# Patient Record
Sex: FEMALE | Race: WHITE | NOT HISPANIC OR LATINO | Employment: FULL TIME | URBAN - NONMETROPOLITAN AREA
[De-identification: names, ages, dates, MRNs, and addresses within clinical notes are randomized per-mention and may not be internally consistent; named-entity substitution may affect disease eponyms.]

---

## 2023-08-01 PROBLEM — M81.0 OSTEOPOROSIS: Status: ACTIVE | Noted: 2023-08-01

## 2023-08-01 PROBLEM — Z78.0 POSTMENOPAUSAL: Status: ACTIVE | Noted: 2023-08-01

## 2023-08-01 PROBLEM — E78.00 HYPERCHOLESTEREMIA: Status: ACTIVE | Noted: 2023-08-01

## 2023-08-01 PROBLEM — R73.03 PREDIABETES: Status: ACTIVE | Noted: 2023-08-01

## 2023-08-01 PROBLEM — E55.9 VITAMIN D DEFICIENCY: Status: ACTIVE | Noted: 2023-08-01

## 2023-08-01 NOTE — PROGRESS NOTES
SUBJECTIVE:      Radha Castañeda. Is 68 y.o.. female. Here for follow up office visit-       Chief Complaint   Patient presents with    Follow-up     6 mo fuv    Hyperlipidemia    Osteoporosis    Prediabetes           Vitamin D Deficiency       HPI:      Follow up for vit d, chol, prediabetes , osteoporosis      Did pt have DXA testing done? No- ordered           Pt is doing well      Due for lab       [facility] Encounter on 02/06/2023   Component Date Value Ref Range Status    Vitamin D, 25-Hydroxy 02/06/2023 37  ng/mL Final    Comment: .  DEFICIENCY:         < 20   NG/ML  INSUFFICIENCY:      20-29  NG/ML  SUFFICIENCY:         NG/ML    THIS ASSAY ACCURATELY QUANTIFIES THE SUM OF  VITAMIN D3, 25-HYDROXY AND VIT D2,25-HYDROXY.      Albumin 02/06/2023 5.0  3.4 - 5.0 g/dL Final    Total Bilirubin 02/06/2023 0.9  0.0 - 1.2 mg/dL Final    Bilirubin, Direct 02/06/2023 0.2  0.0 - 0.3 mg/dL Final    Alkaline Phosphatase 02/06/2023 68  33 - 136 U/L Final    ALT (SGPT) 02/06/2023 20  7 - 45 U/L Final    Comment:  Patients treated with Sulfasalazine may generate    falsely decreased results for ALT.      AST 02/06/2023 20  9 - 39 U/L Final    Total Protein 02/06/2023 7.5  6.4 - 8.2 g/dL Final    Cholesterol 02/06/2023 209 (H)  0 - 199 mg/dL Final    Comment: .      AGE      DESIRABLE   BORDERLINE HIGH   HIGH     0-19 Y     0 - 169       170 - 199     >/= 200    20-24 Y     0 - 189       190 - 224     >/= 225         >24 Y     0 - 199       200 - 239     >/= 240   **All ranges are based on fasting samples. Specific   therapeutic targets will vary based on patient-specific   cardiac risk.  .   Pediatric guidelines reference:Pediatrics 2011, 128(S5).   Adult guidelines reference: NCEP ATPIII Guidelines,     ANTONETTE 2001, 258:2486-97  .   Venipuncture immediately after or during the    administration of Metamizole may lead to falsely   low results. Testing should be performed immediately   prior to Metamizole dosing.      HDL  02/06/2023 87.1  mg/dL Final    Comment: .      AGE      VERY LOW   LOW     NORMAL    HIGH       0-19 Y       < 35   < 40     40-45     ----    20-24 Y       ----   < 40       >45     ----      >24 Y       ----   < 40     40-60      >60  .      Cholesterol/HDL Ratio 02/06/2023 2.4   Final    Comment: REF VALUES  DESIRABLE  < 3.4  HIGH RISK  > 5.0      LDL 02/06/2023 99  0 - 99 mg/dL Final    Comment: .                           NEAR      BORD      AGE      DESIRABLE  OPTIMAL    HIGH     HIGH     VERY HIGH     0-19 Y     0 - 109     ---    110-129   >/= 130     ----    20-24 Y     0 - 119     ---    120-159   >/= 160     ----      >24 Y     0 -  99   100-129  130-159   160-189     >/=190  .      VLDL 02/06/2023 23  0 - 40 mg/dL Final    Triglycerides 02/06/2023 116  0 - 149 mg/dL Final    Comment: .      AGE      DESIRABLE   BORDERLINE HIGH   HIGH     VERY HIGH   0 D-90 D    19 - 174         ----         ----        ----  91 D- 9 Y     0 -  74        75 -  99     >/= 100      ----    10-19 Y     0 -  89        90 - 129     >/= 130      ----    20-24 Y     0 - 114       115 - 149     >/= 150      ----         >24 Y     0 - 149       150 - 199    200- 499    >/= 500  .   Venipuncture immediately after or during the    administration of Metamizole may lead to falsely   low results. Testing should be performed immediately   prior to Metamizole dosing.      Hemoglobin A1C 02/06/2023 5.4  % Final    Comment:      Diagnosis of Diabetes-Adults   Non-Diabetic: < or = 5.6%   Increased risk for developing diabetes: 5.7-6.4%   Diagnostic of diabetes: > or = 6.5%  .       Monitoring of Diabetes                Age (y)     Therapeutic Goal (%)   Adults:          >18           <7.0   Pediatrics:    13-18           <7.5                   7-12           <8.0                   0- 6            7.5-8.5   American Diabetes Association. Diabetes Care 33(S1), Jan 2010.      Estimated Average Glucose 02/06/2023 108  MG/DL Final  "        REVIEW OF SYSTEMS:        OBJECTIVE:    Vitals:    08/07/23 0908   BP: 128/73   BP Location: Left arm   Patient Position: Sitting   BP Cuff Size: Adult   Pulse: 88   Resp: 14   Temp: 37 °C (98.6 °F)   SpO2: 96%   Weight: 64.5 kg (142 lb 1.6 oz)   Height: 1.657 m (5' 5.25\")       PHYSICAL:      Patient is alert and oriented x 3 , NAD  HEAD- normocephalic and atraumatic   EYES-conjunctiva-normal, lids - normal  EARS/NOSE- normal external exam   NECK-supple,FROM  CV- RRR without murmur  PULM- CTA bilaterally, normal respiratory effort  RESPIRATORY EFFORT- normal , no retractions or nasal flaring   ABD- normoactive BS's   EXT- no edema,NT  SKIN- no abnormal skin lesions noted  NEURO- no focal deficits  PSYCH- pleasant, normal judgement and insight    Wt Readings from Last 3 Encounters:   08/07/23 64.5 kg (142 lb 1.6 oz)   02/06/23 63.2 kg (139 lb 4.8 oz)   08/05/22 62.3 kg (137 lb 4.8 oz)       The number and complexity of problems addressed is considered moderate.  The amount and/or complexity of data reviewed and analyzed is considered moderate. The risk of complications and/or morbidity/mortality of patient is considered moderate. Overall, this patient encounter is considered a moderate risk visit.          ASSESSMENT/PLAN:    Problem List Items Addressed This Visit          Active Problems    Hypercholesteremia - Primary    Relevant Medications    atorvastatin (Lipitor) 10 mg tablet    Other Relevant Orders    Follow Up In [facility] - PCP - Established    Osteoporosis    Postmenopausal    Relevant Orders    XR bone density w SPECT lumbar    Prediabetes    Relevant Orders    Hemoglobin A1C    Vitamin D deficiency    Relevant Orders    Vitamin D, Total     Other Visit Diagnoses       Hypercholesterolemia        Relevant Orders    Hepatic Function Panel    Lipid Panel            Orders Placed This Encounter   Procedures    XR bone density w SPECT lumbar    Hepatic Function Panel    Lipid Panel    " Vitamin D, Total    Hemoglobin A1C               Continue medication      Ordered lab      Follow up in 6 months

## 2023-08-03 RX ORDER — MULTIVITAMIN
1 TABLET ORAL DAILY
COMMUNITY

## 2023-08-03 RX ORDER — ACETAMINOPHEN 500 MG
50 TABLET ORAL
COMMUNITY

## 2023-08-03 RX ORDER — ATORVASTATIN CALCIUM 10 MG/1
1 TABLET, FILM COATED ORAL DAILY
COMMUNITY
Start: 2019-11-05 | End: 2023-08-07 | Stop reason: SDUPTHER

## 2023-08-07 ENCOUNTER — OFFICE VISIT (OUTPATIENT)
Dept: PRIMARY CARE | Facility: CLINIC | Age: 68
End: 2023-08-07
Payer: MEDICARE

## 2023-08-07 ENCOUNTER — LAB (OUTPATIENT)
Dept: LAB | Facility: LAB | Age: 68
End: 2023-08-07
Payer: MEDICARE

## 2023-08-07 VITALS
SYSTOLIC BLOOD PRESSURE: 128 MMHG | HEIGHT: 65 IN | DIASTOLIC BLOOD PRESSURE: 73 MMHG | HEART RATE: 88 BPM | WEIGHT: 142.1 LBS | BODY MASS INDEX: 23.68 KG/M2 | TEMPERATURE: 98.6 F | RESPIRATION RATE: 14 BRPM | OXYGEN SATURATION: 96 %

## 2023-08-07 DIAGNOSIS — E78.00 HYPERCHOLESTEREMIA: Primary | ICD-10-CM

## 2023-08-07 DIAGNOSIS — M81.0 OSTEOPOROSIS, UNSPECIFIED OSTEOPOROSIS TYPE, UNSPECIFIED PATHOLOGICAL FRACTURE PRESENCE: ICD-10-CM

## 2023-08-07 DIAGNOSIS — R73.03 PREDIABETES: ICD-10-CM

## 2023-08-07 DIAGNOSIS — E55.9 VITAMIN D DEFICIENCY: ICD-10-CM

## 2023-08-07 DIAGNOSIS — E78.00 HYPERCHOLESTEROLEMIA: ICD-10-CM

## 2023-08-07 DIAGNOSIS — Z78.0 POSTMENOPAUSAL: ICD-10-CM

## 2023-08-07 LAB
ALANINE AMINOTRANSFERASE (SGPT) (U/L) IN SER/PLAS: 15 U/L (ref 7–45)
ALBUMIN (G/DL) IN SER/PLAS: 4.6 G/DL (ref 3.4–5)
ALKALINE PHOSPHATASE (U/L) IN SER/PLAS: 63 U/L (ref 33–136)
ASPARTATE AMINOTRANSFERASE (SGOT) (U/L) IN SER/PLAS: 17 U/L (ref 9–39)
BILIRUBIN DIRECT (MG/DL) IN SER/PLAS: 0.2 MG/DL (ref 0–0.3)
BILIRUBIN TOTAL (MG/DL) IN SER/PLAS: 0.9 MG/DL (ref 0–1.2)
CALCIDIOL (25 OH VITAMIN D3) (NG/ML) IN SER/PLAS: 33 NG/ML
CHOLESTEROL (MG/DL) IN SER/PLAS: 191 MG/DL (ref 0–199)
CHOLESTEROL IN HDL (MG/DL) IN SER/PLAS: 81.6 MG/DL
CHOLESTEROL/HDL RATIO: 2.3
ESTIMATED AVERAGE GLUCOSE FOR HBA1C: 105 MG/DL
HEMOGLOBIN A1C/HEMOGLOBIN TOTAL IN BLOOD: 5.3 %
LDL: 86 MG/DL (ref 0–99)
PROTEIN TOTAL: 7.3 G/DL (ref 6.4–8.2)
TRIGLYCERIDE (MG/DL) IN SER/PLAS: 117 MG/DL (ref 0–149)
VLDL: 23 MG/DL (ref 0–40)

## 2023-08-07 PROCEDURE — 82306 VITAMIN D 25 HYDROXY: CPT

## 2023-08-07 PROCEDURE — 1036F TOBACCO NON-USER: CPT | Performed by: FAMILY MEDICINE

## 2023-08-07 PROCEDURE — 83036 HEMOGLOBIN GLYCOSYLATED A1C: CPT

## 2023-08-07 PROCEDURE — 1160F RVW MEDS BY RX/DR IN RCRD: CPT | Performed by: FAMILY MEDICINE

## 2023-08-07 PROCEDURE — 1159F MED LIST DOCD IN RCRD: CPT | Performed by: FAMILY MEDICINE

## 2023-08-07 PROCEDURE — 80076 HEPATIC FUNCTION PANEL: CPT

## 2023-08-07 PROCEDURE — 80061 LIPID PANEL: CPT

## 2023-08-07 PROCEDURE — 99214 OFFICE O/P EST MOD 30 MIN: CPT | Performed by: FAMILY MEDICINE

## 2023-08-07 PROCEDURE — 36415 COLL VENOUS BLD VENIPUNCTURE: CPT

## 2023-08-07 RX ORDER — ATORVASTATIN CALCIUM 10 MG/1
10 TABLET, FILM COATED ORAL DAILY
Qty: 90 TABLET | Refills: 3 | Status: SHIPPED | OUTPATIENT
Start: 2023-08-07

## 2023-08-11 ENCOUNTER — TELEPHONE (OUTPATIENT)
Dept: PRIMARY CARE | Facility: CLINIC | Age: 68
End: 2023-08-11
Payer: MEDICARE

## 2023-08-11 NOTE — TELEPHONE ENCOUNTER
The patient is calling to ask for the results of labs resulted on 8/7.  Please call the patient on home phone

## 2024-02-06 PROBLEM — Z00.00 MEDICARE ANNUAL WELLNESS VISIT, SUBSEQUENT: Chronic | Status: ACTIVE | Noted: 2024-02-06

## 2024-02-06 PROBLEM — E78.00 HYPERCHOLESTEROLEMIA: Chronic | Status: ACTIVE | Noted: 2024-02-06

## 2024-02-06 PROBLEM — M81.0 OSTEOPOROSIS: Chronic | Status: ACTIVE | Noted: 2023-08-01

## 2024-02-06 PROBLEM — Z12.31 ENCOUNTER FOR SCREENING MAMMOGRAM FOR MALIGNANT NEOPLASM OF BREAST: Chronic | Status: ACTIVE | Noted: 2024-02-06

## 2024-02-06 PROBLEM — Z13.89 SCREENING FOR MULTIPLE CONDITIONS: Chronic | Status: ACTIVE | Noted: 2024-02-06

## 2024-02-06 NOTE — PROGRESS NOTES
Subjective        Radha Castañeda is a 68 y.o. female here for follow up   Chief Complaint   Patient presents with    Medicare Annual Wellness Visit Subsequent    Follow-up    Hyperlipidemia    Prediabetes    Vitamin D Deficiency       Chief Complaint   Patient presents with    Medicare Annual Wellness Visit Subsequent    Follow-up    Hyperlipidemia    Prediabetes    Vitamin D Deficiency        HPI     Wellness/health maintenance also discussed/[facility] note      Has pt had DXA done ?  No      When was last colonoscopy?  15+ years ago            Pt is doing well     Also here for follow up vit d, cholesterol , prediabetes     Due for lab                 Patient Health Questionnaire-2 Score: 0 (02/07/24 0907)       A physical exam and/or Medicare Wellness was done at  today's office visit and if patient has requested other problems be addressed at today's office visit in addition to the physical portion of the visit:  the patient has been informed that they may be charged for both portions: discussing the medical problems they want addressed and the complete physical exam. Patient has signed consent and was given brochure.          Medicare Wellness Exam    The patent is being seen for subsequent  annual wellness visit  Past Medical, Surgical and family History: Reviewed and updated in chart  Medications and Supplements: Review of all medications by a prescribing practitioner or clinical pharmacist (such as prescriptions, OTC, Herbal therapies and supplements) documented in the medical record.      Immunization History   Administered Date(s) Administered    DT (pediatric) 10/11/1999    Influenza, High Dose Seasonal, Preservative Free 10/04/2022    Influenza, Unspecified 09/19/2015, 10/14/2016    Influenza, injectable, quadrivalent 09/21/2020    Influenza, seasonal, injectable 10/01/2016, 09/20/2017, 09/23/2018, 10/24/2019, 10/29/2021    Pfizer COVID-19 vaccine, Fall 2023, 12 years and older, (30mcg/0.3mL) 01/04/2024     Pfizer Purple Cap SARS-CoV-2 04/15/2021, 05/07/2021, 12/10/2021, 10/18/2022    Pneumococcal conjugate vaccine, 20-valent (PREVNAR 20) 02/06/2023    SARS-CoV-2, Unspecified 07/19/2022    Tdap vaccine, age 7 year and older (BOOSTRIX, ADACEL) 08/23/2010           Lab on 08/07/2023   Component Date Value Ref Range Status    Albumin 08/07/2023 4.6  3.4 - 5.0 g/dL Final    Total Bilirubin 08/07/2023 0.9  0.0 - 1.2 mg/dL Final    Bilirubin, Direct 08/07/2023 0.2  0.0 - 0.3 mg/dL Final    Alkaline Phosphatase 08/07/2023 63  33 - 136 U/L Final    ALT (SGPT) 08/07/2023 15  7 - 45 U/L Final     Patients treated with Sulfasalazine may generate    falsely decreased results for ALT.    AST 08/07/2023 17  9 - 39 U/L Final    Total Protein 08/07/2023 7.3  6.4 - 8.2 g/dL Final    Cholesterol 08/07/2023 191  0 - 199 mg/dL Final    .      AGE      DESIRABLE   BORDERLINE HIGH   HIGH     0-19 Y     0 - 169       170 - 199     >/= 200    20-24 Y     0 - 189       190 - 224     >/= 225         >24 Y     0 - 199       200 - 239     >/= 240   **All ranges are based on fasting samples. Specific   therapeutic targets will vary based on patient-specific   cardiac risk.  .   Pediatric guidelines reference:Pediatrics 2011, 128(S5).   Adult guidelines reference: NCEP ATPIII Guidelines,     ANTONETTE 2001, 258:2486-97  .   Venipuncture immediately after or during the    administration of Metamizole may lead to falsely   low results. Testing should be performed immediately   prior to Metamizole dosing.    HDL 08/07/2023 81.6  mg/dL Final    .      AGE      VERY LOW   LOW     NORMAL    HIGH       0-19 Y       < 35   < 40     40-45     ----    20-24 Y       ----   < 40       >45     ----      >24 Y       ----   < 40     40-60      >60  .    Cholesterol/HDL Ratio 08/07/2023 2.3   Final    REF VALUES  DESIRABLE  < 3.4  HIGH RISK  > 5.0    LDL 08/07/2023 86  0 - 99 mg/dL Final    .                           NEAR      BORD      AGE      DESIRABLE  OPTIMAL     HIGH     HIGH     VERY HIGH     0-19 Y     0 - 109     ---    110-129   >/= 130     ----    20-24 Y     0 - 119     ---    120-159   >/= 160     ----      >24 Y     0 -  99   100-129  130-159   160-189     >/=190  .    VLDL 08/07/2023 23  0 - 40 mg/dL Final    Triglycerides 08/07/2023 117  0 - 149 mg/dL Final    .      AGE      DESIRABLE   BORDERLINE HIGH   HIGH     VERY HIGH   0 D-90 D    19 - 174         ----         ----        ----  91 D- 9 Y     0 -  74        75 -  99     >/= 100      ----    10-19 Y     0 -  89        90 - 129     >/= 130      ----    20-24 Y     0 - 114       115 - 149     >/= 150      ----         >24 Y     0 - 149       150 - 199    200- 499    >/= 500  .   Venipuncture immediately after or during the    administration of Metamizole may lead to falsely   low results. Testing should be performed immediately   prior to Metamizole dosing.    Vitamin D, 25-Hydroxy 08/07/2023 33  ng/mL Final    .  DEFICIENCY:         < 20   NG/ML  INSUFFICIENCY:      20-29  NG/ML  SUFFICIENCY:         NG/ML    THIS ASSAY ACCURATELY QUANTIFIES THE SUM OF  VITAMIN D3, 25-HYDROXY AND VIT D2,25-HYDROXY.    Hemoglobin A1C 08/07/2023 5.3  % Final         Diagnosis of Diabetes-Adults   Non-Diabetic: < or = 5.6%   Increased risk for developing diabetes: 5.7-6.4%   Diagnostic of diabetes: > or = 6.5%  .       Monitoring of Diabetes                Age (y)     Therapeutic Goal (%)   Adults:          >18           <7.0   Pediatrics:    13-18           <7.5                   7-12           <8.0                   0- 6            7.5-8.5   American Diabetes Association. Diabetes Care 33(S1), Jan 2010.    Estimated Average Glucose 08/07/2023 105  MG/DL Final             Health Maintenance Topics        Topic Date     Medicare Annual Wellness Visit (AWV)     Colorectal Cancer Screening     Bone Density Scan- history osteoporosis DXA ordered 2/2023 and 8/2023    Mammogram 11/2022       Health Maintenance Topics with due  ED status: Completed       Topic Last Completion Date    Hepatitis C Screening 02/04/2022    Pneumococcal Vaccine: 65+ Years- P-20  02/06/2023     Health Maintenance Topics with due status: Aged Out       Topic Date Due    HIB Vaccines Aged Out    Hepatitis B Vaccines Aged Out    IPV Vaccines Aged Out    Hepatitis A Vaccines Aged Out    Meningococcal Vaccine Aged Out    Rotavirus Vaccines Aged Out    HPV Vaccines Aged Out           Social History     Tobacco Use   Smoking Status Never   Smokeless Tobacco Never       Alcohol Use: Not on file         Objective        Vitals:    02/07/24 0911   BP: 116/77   BP Location: Left arm   Patient Position: Sitting   BP Cuff Size: Adult   Pulse: 92   Temp: 36.7 °C (98.1 °F)   TempSrc: Temporal   SpO2: 97%   Weight: 64.1 kg (141 lb 6.4 oz)         Patient is alert and oriented x 3 , NAD  HEAD- normocephalic and atraumatic   EYES-conjunctiva-normal, lids - normal  EARS/NOSE- normal external exam   NECK-supple,FROM  CV- RRR without murmur  PULM- CTA bilaterally, normal respiratory effort  RESPIRATORY EFFORT- normal , no retractions or nasal flaring   ABD- normoactive BS's   EXT- no edema,NT  SKIN- no abnormal skin lesions noted  NEURO- no focal deficits  PSYCH- pleasant, normal judgement and insight      BP Readings from Last 3 Encounters:   02/07/24 116/77   08/07/23 128/73   02/06/23 126/76           Wt Readings from Last 3 Encounters:   02/07/24 64.1 kg (141 lb 6.4 oz)   08/07/23 64.5 kg (142 lb 1.6 oz)   02/06/23 63.2 kg (139 lb 4.8 oz)         BMI Readings from Last 3 Encounters:   02/07/24 23.35 kg/m²   08/07/23 23.47 kg/m²   02/06/23 23.18 kg/m²         The number and complexity of problems addressed is considered moderate.  The amount and/or complexity of data reviewed and analyzed is considered moderate. The risk of complications and/or morbidity/mortality of patient is considered moderate. Overall, this patient encounter is considered a moderate risk visit.    Patient has  no concerns with pain today.      Patient is not on any high risk medications.      Patient is compliant with their medications.    Statins:   Although side effects believed to be caused by statins can be annoying, consider the benefits of taking a statin before you decide to stop taking your medication. Remember that statin medications can reduce your risk of a heart attack or stroke, and the risk of life-threatening side effects from statins is very low.    If you have read about the potential side effects of statins, you may be more likely to blame your symptoms on the medication, whether or not they're truly caused by the drug.    Even if your side effects are frustrating, don't stop taking your statin medication for any period of time without talking to your doctor first. Your doctor may be able to come up with an alternative treatment plan that can help you lower your cholesterol without uncomfortable side effects.    Side effects that may occur include muscle aches, elevation liver enzymes, very small incidence memory loss or confusion and increasing blood sugar.        Assessment/Plan            Problem List Items Addressed This Visit          Active Problems    Encounter for screening mammogram for malignant neoplasm of breast (Chronic)    Relevant Orders    BI mammo bilateral screening tomosynthesis    Hypercholesteremia    Hypercholesterolemia (Chronic)    Relevant Orders    Hepatic Function Panel    Lipid Panel    Follow Up In [facility] - PCP - Established    Medicare annual wellness visit, subsequent - Primary (Chronic)    Osteoporosis (Chronic)    Postmenopausal    Relevant Orders    XR DEXA bone density    Prediabetes    Relevant Orders    Hemoglobin A1C    Screening for multiple conditions (Chronic)    Vitamin D deficiency    Relevant Orders    Vitamin D 25-Hydroxy,Total (for eval of Vitamin D levels)     Other Visit Diagnoses       Screen for colon cancer        Relevant Orders     Colonoscopy Screening; Average Risk Patient                Orders Placed This Encounter   Procedures    BI mammo bilateral screening tomosynthesis    XR DEXA bone density    Hepatic Function Panel    Lipid Panel    Hemoglobin A1C    Vitamin D 25-Hydroxy,Total (for eval of Vitamin D levels)         Continue medications      Ordered lab        Refer colonoscopy           Schedule mammogram and DXA       Increase activity        Follow up in 6 months            Recommendations for women annual wellness exam:   Make sure screenings for cervical and breast cancer are up to date if applicable- pap smears age 21-65  mammogram starting at age 35- 40 or sooner if positive family history of breast cancer   colonoscopy at age 45, earlier if positive family history for breast or colon cancer   Screen for osteoporosis with DXA bone scan starting at age 65 or sooner if risk factors present (long term steroid use, smoking, heavy alcohol use, history of fracture, rheumatoid arthritis, low body weight, family history of hip fracture)  Screening for lung cancer with low-dose CT in all adults age 50-80 who have a 20 pack-year smoking history and currently smoke or have quit within the past 15 years; and are symptom free/no prior pulmonary diagnosis  Gardisil vaccine age 9-26 years of age   Follow a healthy diet (Examples, Dash diet, Mediterranean diet)  Exercise 150 minutes per week   Maintain healthy weight (BMI < 25)  Do not smoke   Alcohol in moderation (up to 1 drink/day)  Get enough sleep (7-8 hours/night)  Take a prenatal vitamin with folic acid if possibility of pregnancy   Make sure immunizations are up to date   Recommend minimum 1,000 mg calcium and 600-800 IU vitamin D daily (combination of diet + supplement)- unless there is a contraindication   Visit dentist twice yearly      If you are less than 60 years old, have diabetes mellitus, or chronic kidney disease, your goal blood pressure is < 140/90.  If you are older than  60 years old and do not have diabetes or kidney disease, your goal blood pressure is < 150/90.

## 2024-02-07 ENCOUNTER — LAB (OUTPATIENT)
Dept: LAB | Facility: LAB | Age: 69
End: 2024-02-07
Payer: MEDICARE

## 2024-02-07 ENCOUNTER — OFFICE VISIT (OUTPATIENT)
Dept: PRIMARY CARE | Facility: CLINIC | Age: 69
End: 2024-02-07
Payer: MEDICARE

## 2024-02-07 VITALS
WEIGHT: 141.4 LBS | DIASTOLIC BLOOD PRESSURE: 77 MMHG | BODY MASS INDEX: 23.35 KG/M2 | HEART RATE: 92 BPM | TEMPERATURE: 98.1 F | OXYGEN SATURATION: 97 % | SYSTOLIC BLOOD PRESSURE: 116 MMHG

## 2024-02-07 DIAGNOSIS — Z13.89 SCREENING FOR MULTIPLE CONDITIONS: Chronic | ICD-10-CM

## 2024-02-07 DIAGNOSIS — E78.00 HYPERCHOLESTEROLEMIA: Chronic | ICD-10-CM

## 2024-02-07 DIAGNOSIS — Z12.11 SCREEN FOR COLON CANCER: ICD-10-CM

## 2024-02-07 DIAGNOSIS — R73.03 PREDIABETES: Chronic | ICD-10-CM

## 2024-02-07 DIAGNOSIS — M81.0 OSTEOPOROSIS, UNSPECIFIED OSTEOPOROSIS TYPE, UNSPECIFIED PATHOLOGICAL FRACTURE PRESENCE: Chronic | ICD-10-CM

## 2024-02-07 DIAGNOSIS — E55.9 VITAMIN D DEFICIENCY: Chronic | ICD-10-CM

## 2024-02-07 DIAGNOSIS — Z00.00 MEDICARE ANNUAL WELLNESS VISIT, SUBSEQUENT: Primary | Chronic | ICD-10-CM

## 2024-02-07 DIAGNOSIS — Z78.0 POSTMENOPAUSAL: Chronic | ICD-10-CM

## 2024-02-07 DIAGNOSIS — Z12.31 ENCOUNTER FOR SCREENING MAMMOGRAM FOR MALIGNANT NEOPLASM OF BREAST: Chronic | ICD-10-CM

## 2024-02-07 DIAGNOSIS — E78.00 HYPERCHOLESTEREMIA: Chronic | ICD-10-CM

## 2024-02-07 PROCEDURE — 83036 HEMOGLOBIN GLYCOSYLATED A1C: CPT

## 2024-02-07 PROCEDURE — 1160F RVW MEDS BY RX/DR IN RCRD: CPT | Performed by: FAMILY MEDICINE

## 2024-02-07 PROCEDURE — 80076 HEPATIC FUNCTION PANEL: CPT

## 2024-02-07 PROCEDURE — 1036F TOBACCO NON-USER: CPT | Performed by: FAMILY MEDICINE

## 2024-02-07 PROCEDURE — 1159F MED LIST DOCD IN RCRD: CPT | Performed by: FAMILY MEDICINE

## 2024-02-07 PROCEDURE — 99214 OFFICE O/P EST MOD 30 MIN: CPT | Performed by: FAMILY MEDICINE

## 2024-02-07 PROCEDURE — 80061 LIPID PANEL: CPT

## 2024-02-07 PROCEDURE — 1170F FXNL STATUS ASSESSED: CPT | Performed by: FAMILY MEDICINE

## 2024-02-07 PROCEDURE — 82306 VITAMIN D 25 HYDROXY: CPT

## 2024-02-07 PROCEDURE — G0439 PPPS, SUBSEQ VISIT: HCPCS | Performed by: FAMILY MEDICINE

## 2024-02-07 PROCEDURE — G0444 DEPRESSION SCREEN ANNUAL: HCPCS | Performed by: FAMILY MEDICINE

## 2024-02-07 PROCEDURE — 36415 COLL VENOUS BLD VENIPUNCTURE: CPT

## 2024-02-07 ASSESSMENT — ACTIVITIES OF DAILY LIVING (ADL)
MANAGING_FINANCES: INDEPENDENT
DRESSING: INDEPENDENT
TAKING_MEDICATION: INDEPENDENT
DOING_HOUSEWORK: INDEPENDENT
BATHING: INDEPENDENT
GROCERY_SHOPPING: INDEPENDENT

## 2024-02-07 ASSESSMENT — ENCOUNTER SYMPTOMS
DEPRESSION: 0
LOSS OF SENSATION IN FEET: 0
OCCASIONAL FEELINGS OF UNSTEADINESS: 0

## 2024-02-07 ASSESSMENT — PATIENT HEALTH QUESTIONNAIRE - PHQ9
2. FEELING DOWN, DEPRESSED OR HOPELESS: NOT AT ALL
SUM OF ALL RESPONSES TO PHQ9 QUESTIONS 1 AND 2: 0
1. LITTLE INTEREST OR PLEASURE IN DOING THINGS: NOT AT ALL

## 2024-02-08 LAB
25(OH)D3 SERPL-MCNC: 51 NG/ML (ref 30–100)
ALBUMIN SERPL BCP-MCNC: 4.6 G/DL (ref 3.4–5)
ALP SERPL-CCNC: 61 U/L (ref 33–136)
ALT SERPL W P-5'-P-CCNC: 18 U/L (ref 7–45)
AST SERPL W P-5'-P-CCNC: 18 U/L (ref 9–39)
BILIRUB DIRECT SERPL-MCNC: 0.1 MG/DL (ref 0–0.3)
BILIRUB SERPL-MCNC: 0.8 MG/DL (ref 0–1.2)
CHOLEST SERPL-MCNC: 206 MG/DL (ref 0–199)
CHOLESTEROL/HDL RATIO: 2.6
EST. AVERAGE GLUCOSE BLD GHB EST-MCNC: 111 MG/DL
HBA1C MFR BLD: 5.5 %
HDLC SERPL-MCNC: 79.9 MG/DL
LDLC SERPL CALC-MCNC: 104 MG/DL
NON HDL CHOLESTEROL: 126 MG/DL (ref 0–149)
PROT SERPL-MCNC: 7.1 G/DL (ref 6.4–8.2)
TRIGL SERPL-MCNC: 109 MG/DL (ref 0–149)
VLDL: 22 MG/DL (ref 0–40)

## 2024-07-31 ENCOUNTER — HOSPITAL ENCOUNTER (OUTPATIENT)
Dept: RADIOLOGY | Facility: CLINIC | Age: 69
Discharge: HOME | End: 2024-07-31
Payer: MEDICARE

## 2024-07-31 DIAGNOSIS — Z78.0 POSTMENOPAUSAL: Chronic | ICD-10-CM

## 2024-07-31 PROCEDURE — 77080 DXA BONE DENSITY AXIAL: CPT

## 2024-07-31 PROCEDURE — 77080 DXA BONE DENSITY AXIAL: CPT | Performed by: STUDENT IN AN ORGANIZED HEALTH CARE EDUCATION/TRAINING PROGRAM

## 2024-07-31 ASSESSMENT — LIFESTYLE VARIABLES
CURRENT_SMOKER: N
3_OR_MORE_DRINKS_PER_DAY: N

## 2024-08-01 DIAGNOSIS — E78.00 HYPERCHOLESTEREMIA: ICD-10-CM

## 2024-08-01 DIAGNOSIS — M81.0 OSTEOPOROSIS, UNSPECIFIED OSTEOPOROSIS TYPE, UNSPECIFIED PATHOLOGICAL FRACTURE PRESENCE: Primary | Chronic | ICD-10-CM

## 2024-08-01 RX ORDER — ATORVASTATIN CALCIUM 10 MG/1
10 TABLET, FILM COATED ORAL DAILY
Qty: 90 TABLET | Refills: 3 | Status: SHIPPED | OUTPATIENT
Start: 2024-08-01

## 2024-08-05 NOTE — PROGRESS NOTES
Subjective        Radha Castañeda. Is 69 y.o.. female. Here for follow up office visit-       Chief Complaint   Patient presents with    Follow-up     Spouse states pt is more fatigued than normal- after exhuartion.      Spouse has noticed that pt has been more fatigued than usual    Doesn't want to walk as much as usual    No CP, no SOB    Pt never exercises       No cigs      Retired 2 years ago      Stays up very late       Not in any routine       Discussed CT cardiac score       No fever       Occ cough       HPI:    DXA 7/31/24- showed osteoporosis- referred to Rheumatologist- has pt made appointment yet? No-          Follow up for vit d, cholesterol , prediabetes , postmenopausal, osteoporosis      7/2024 mammo neg          Pt is doing well      Due for lab       Lab on 02/07/2024   Component Date Value Ref Range Status    Albumin 02/07/2024 4.6  3.4 - 5.0 g/dL Final    Bilirubin, Total 02/07/2024 0.8  0.0 - 1.2 mg/dL Final    Bilirubin, Direct 02/07/2024 0.1  0.0 - 0.3 mg/dL Final    Alkaline Phosphatase 02/07/2024 61  33 - 136 U/L Final    ALT 02/07/2024 18  7 - 45 U/L Final    Patients treated with Sulfasalazine may generate falsely decreased results for ALT.    AST 02/07/2024 18  9 - 39 U/L Final    Total Protein 02/07/2024 7.1  6.4 - 8.2 g/dL Final    Cholesterol 02/07/2024 206 (H)  0 - 199 mg/dL Final          Age      Desirable   Borderline High   High     0-19 Y     0 - 169       170 - 199     >/= 200    20-24 Y     0 - 189       190 - 224     >/= 225         >24 Y     0 - 199       200 - 239     >/= 240   **All ranges are based on fasting samples. Specific   therapeutic targets will vary based on patient-specific   cardiac risk.    Pediatric guidelines reference:Pediatrics 2011, 128(S5).Adult guidelines reference: NCEP ATPIII Guidelines,ANTONETTE 2001, 258:2486-97    Venipuncture immediately after or during the administration of Metamizole may lead to falsely low results. Testing should be performed  immediately prior to Metamizole dosing.    HDL-Cholesterol 02/07/2024 79.9  mg/dL Final      Age       Very Low   Low     Normal    High    0-19 Y    < 35      < 40     40-45     ----  20-24 Y    ----     < 40      >45      ----        >24 Y      ----     < 40     40-60      >60      Cholesterol/HDL Ratio 02/07/2024 2.6   Final      Ref Values  Desirable  < 3.4  High Risk  > 5.0    LDL Calculated 02/07/2024 104 (H)  <=99 mg/dL Final                                Near   Borderline      AGE      Desirable  Optimal    High     High     Very High     0-19 Y     0 - 109     ---    110-129   >/= 130     ----    20-24 Y     0 - 119     ---    120-159   >/= 160     ----      >24 Y     0 -  99   100-129  130-159   160-189     >/=190      VLDL 02/07/2024 22  0 - 40 mg/dL Final    Triglycerides 02/07/2024 109  0 - 149 mg/dL Final       Age         Desirable   Borderline High   High     Very High   0 D-90 D    19 - 174         ----         ----        ----  91 D- 9 Y     0 -  74        75 -  99     >/= 100      ----    10-19 Y     0 -  89        90 - 129     >/= 130      ----    20-24 Y     0 - 114       115 - 149     >/= 150      ----         >24 Y     0 - 149       150 - 199    200- 499    >/= 500    Venipuncture immediately after or during the administration of Metamizole may lead to falsely low results. Testing should be performed immediately prior to Metamizole dosing.    Non HDL Cholesterol 02/07/2024 126  0 - 149 mg/dL Final          Age       Desirable   Borderline High   High     Very High     0-19 Y     0 - 119       120 - 144     >/= 145    >/= 160    20-24 Y     0 - 149       150 - 189     >/= 190      ----         >24 Y    30 mg/dL above LDL Cholesterol goal      Hemoglobin A1C 02/07/2024 5.5  see below % Final    Estimated Average Glucose 02/07/2024 111  Not Established mg/dL Final    Vitamin D, 25-Hydroxy, Total 02/07/2024 51  30 - 100 ng/mL Final         REVIEW OF SYSTEMS:        Objective      Vitals:     "08/07/24 0834   BP: 121/77   BP Location: Left arm   Patient Position: Sitting   BP Cuff Size: Adult   Pulse: 92   Resp: 14   Temp: 35.8 °C (96.5 °F)   TempSrc: Temporal   SpO2: 98%   Weight: 64.5 kg (142 lb 3.2 oz)   Height: 1.651 m (5' 5\")                       PHYSICAL:      CONSTITUTIONAL- NAD, Pt is A and O x3, Vital signs are within normal limits, well- hydrated, non-toxic   General Appearance- normal , good hygiene, talks easily  EYES- conjunctiva and lids- normal  EARS/NOSE-TM's normal, head normocephalic and atraumatic, nasopharynx-no nasal discharge, no trismus, no hot potato voice, oropharynx- normal  NECK- supple, FROM  LYMPH- no cervical lymph nodes palpated   CV- RRR without murmur  PULM- no rhonchi, no wheezes bilaterally       Respiratory effort- normal respiratory effort , no retractions or nasal flaring   ABDOMEN- normoactive BS's , soft , NT, no hepatosplenomegaly palpated  EXTREMITIES- no edema or varicosities           SKIN- no abnormal skin lesions on inspection or palpation   NEURO- no focal deficits  PSYCH- pleasant, normal judgement and insight              BP Readings from Last 3 Encounters:   08/07/24 121/77   02/07/24 116/77   08/07/23 128/73             Wt Readings from Last 3 Encounters:   08/07/24 64.5 kg (142 lb 3.2 oz)   02/07/24 64.1 kg (141 lb 6.4 oz)   08/07/23 64.5 kg (142 lb 1.6 oz)           BMI Readings from Last 3 Encounters:   08/07/24 23.66 kg/m²   02/07/24 23.35 kg/m²   08/07/23 23.47 kg/m²               The number and complexity of problems addressed is considered moderate.  The amount and/or complexity of data reviewed and analyzed is considered moderate. The risk of complications and/or morbidity/mortality of patient is considered moderate. Overall, this patient encounter is considered a moderate risk visit.    Statins:   Although side effects believed to be caused by statins can be annoying, consider the benefits of taking a statin before you decide to stop taking your " medication. Remember that statin medications can reduce your risk of a heart attack or stroke, and the risk of life-threatening side effects from statins is very low.    If you have read about the potential side effects of statins, you may be more likely to blame your symptoms on the medication, whether or not they're truly caused by the drug.    Even if your side effects are frustrating, don't stop taking your statin medication for any period of time without talking to your doctor first. Your doctor may be able to come up with an alternative treatment plan that can help you lower your cholesterol without uncomfortable side effects.    Side effects that may occur include muscle aches, elevation liver enzymes, very small incidence memory loss or confusion and increasing blood sugar.        Assessment/Plan      Assessment & Plan  Hypercholesterolemia  On atorvastatin   Orders:    Hepatic Function Panel; Future    Lipid Panel; Future    Follow Up In [facility] - PCP - Established    Follow Up In [facility] - PCP - Established; Future    Prediabetes    Orders:    Hemoglobin A1C; Future    Vitamin D deficiency  Taking suppleent   Orders:    Vitamin D 25-Hydroxy,Total (for eval of Vitamin D levels); Future    Postmenopausal  See DXA        Osteoporosis, unspecified osteoporosis type, unspecified pathological fracture presence  Was referred to Rheum- 7/2024 DXA - will see Marina today        Other fatigue    Orders:    CBC; Future    Basic Metabolic Panel; Future    Thyroid Stimulating Hormone; Future    Vitamin B12; Future    Screening for cardiovascular condition  CT cardiac score   Orders:    CT cardiac scoring wo IV contrast; Future    Acute cough  Xray ordered   Orders:    XR chest 2 views; Future              Continue medication      Ordered lab      Follow up in 6 months

## 2024-08-05 NOTE — ASSESSMENT & PLAN NOTE
On atorvastatin   Orders:    Hepatic Function Panel; Future    Lipid Panel; Future    Follow Up [facility] - PCP - Established    Follow Up In [facility] - PCP - Established; Future

## 2024-08-07 ENCOUNTER — APPOINTMENT (OUTPATIENT)
Dept: PRIMARY CARE | Facility: CLINIC | Age: 69
End: 2024-08-07
Payer: MEDICARE

## 2024-08-07 ENCOUNTER — LAB (OUTPATIENT)
Dept: LAB | Facility: LAB | Age: 69
End: 2024-08-07
Payer: MEDICARE

## 2024-08-07 VITALS
BODY MASS INDEX: 23.69 KG/M2 | OXYGEN SATURATION: 98 % | TEMPERATURE: 96.5 F | HEIGHT: 65 IN | HEART RATE: 92 BPM | SYSTOLIC BLOOD PRESSURE: 121 MMHG | DIASTOLIC BLOOD PRESSURE: 77 MMHG | RESPIRATION RATE: 14 BRPM | WEIGHT: 142.2 LBS

## 2024-08-07 DIAGNOSIS — R05.1 ACUTE COUGH: Chronic | ICD-10-CM

## 2024-08-07 DIAGNOSIS — M81.0 OSTEOPOROSIS, UNSPECIFIED OSTEOPOROSIS TYPE, UNSPECIFIED PATHOLOGICAL FRACTURE PRESENCE: Chronic | ICD-10-CM

## 2024-08-07 DIAGNOSIS — E55.9 VITAMIN D DEFICIENCY: Chronic | ICD-10-CM

## 2024-08-07 DIAGNOSIS — Z78.0 POSTMENOPAUSAL: Chronic | ICD-10-CM

## 2024-08-07 DIAGNOSIS — R73.03 PREDIABETES: Chronic | ICD-10-CM

## 2024-08-07 DIAGNOSIS — Z13.6 SCREENING FOR CARDIOVASCULAR CONDITION: Chronic | ICD-10-CM

## 2024-08-07 DIAGNOSIS — R53.83 OTHER FATIGUE: Chronic | ICD-10-CM

## 2024-08-07 DIAGNOSIS — E78.00 HYPERCHOLESTEROLEMIA: Chronic | ICD-10-CM

## 2024-08-07 DIAGNOSIS — E78.00 HYPERCHOLESTEROLEMIA: Primary | Chronic | ICD-10-CM

## 2024-08-07 PROCEDURE — 82607 VITAMIN B-12: CPT

## 2024-08-07 PROCEDURE — 3008F BODY MASS INDEX DOCD: CPT | Performed by: FAMILY MEDICINE

## 2024-08-07 PROCEDURE — 84443 ASSAY THYROID STIM HORMONE: CPT

## 2024-08-07 PROCEDURE — 1036F TOBACCO NON-USER: CPT | Performed by: FAMILY MEDICINE

## 2024-08-07 PROCEDURE — 85027 COMPLETE CBC AUTOMATED: CPT

## 2024-08-07 PROCEDURE — 80061 LIPID PANEL: CPT

## 2024-08-07 PROCEDURE — 1159F MED LIST DOCD IN RCRD: CPT | Performed by: FAMILY MEDICINE

## 2024-08-07 PROCEDURE — 82306 VITAMIN D 25 HYDROXY: CPT

## 2024-08-07 PROCEDURE — 36415 COLL VENOUS BLD VENIPUNCTURE: CPT

## 2024-08-07 PROCEDURE — 80053 COMPREHEN METABOLIC PANEL: CPT

## 2024-08-07 PROCEDURE — 83036 HEMOGLOBIN GLYCOSYLATED A1C: CPT

## 2024-08-07 PROCEDURE — 99214 OFFICE O/P EST MOD 30 MIN: CPT | Performed by: FAMILY MEDICINE

## 2024-08-07 PROCEDURE — 1160F RVW MEDS BY RX/DR IN RCRD: CPT | Performed by: FAMILY MEDICINE

## 2024-08-07 PROCEDURE — 82248 BILIRUBIN DIRECT: CPT

## 2024-08-07 ASSESSMENT — PATIENT HEALTH QUESTIONNAIRE - PHQ9
SUM OF ALL RESPONSES TO PHQ9 QUESTIONS 1 AND 2: 0
1. LITTLE INTEREST OR PLEASURE IN DOING THINGS: NOT AT ALL
2. FEELING DOWN, DEPRESSED OR HOPELESS: NOT AT ALL

## 2024-08-07 NOTE — ASSESSMENT & PLAN NOTE
Orders:    CBC; Future    Basic Metabolic Panel; Future    Thyroid Stimulating Hormone; Future    Vitamin B12; Future

## 2024-08-08 DIAGNOSIS — R79.89 ELEVATED SERUM CREATININE: Primary | Chronic | ICD-10-CM

## 2024-08-08 PROBLEM — E55.9 VITAMIN D DEFICIENCY: Chronic | Status: ACTIVE | Noted: 2023-08-01

## 2024-08-08 PROBLEM — Z78.0 POSTMENOPAUSAL: Chronic | Status: ACTIVE | Noted: 2023-08-01

## 2024-08-08 PROBLEM — E78.00 HYPERCHOLESTEREMIA: Chronic | Status: ACTIVE | Noted: 2023-08-01

## 2024-08-08 PROBLEM — R73.03 PREDIABETES: Chronic | Status: ACTIVE | Noted: 2023-08-01

## 2024-08-08 LAB
25(OH)D3 SERPL-MCNC: 47 NG/ML (ref 30–100)
ALBUMIN SERPL BCP-MCNC: 4.9 G/DL (ref 3.4–5)
ALP SERPL-CCNC: 62 U/L (ref 33–136)
ALT SERPL W P-5'-P-CCNC: 16 U/L (ref 7–45)
ANION GAP SERPL CALC-SCNC: 14 MMOL/L (ref 10–20)
AST SERPL W P-5'-P-CCNC: 16 U/L (ref 9–39)
BILIRUB DIRECT SERPL-MCNC: 0.2 MG/DL (ref 0–0.3)
BILIRUB SERPL-MCNC: 1.1 MG/DL (ref 0–1.2)
BUN SERPL-MCNC: 23 MG/DL (ref 6–23)
CALCIUM SERPL-MCNC: 10.7 MG/DL (ref 8.6–10.6)
CHLORIDE SERPL-SCNC: 106 MMOL/L (ref 98–107)
CHOLEST SERPL-MCNC: 200 MG/DL (ref 0–199)
CHOLESTEROL/HDL RATIO: 2.5
CO2 SERPL-SCNC: 26 MMOL/L (ref 21–32)
CREAT SERPL-MCNC: 1.17 MG/DL (ref 0.5–1.05)
EGFRCR SERPLBLD CKD-EPI 2021: 51 ML/MIN/1.73M*2
ERYTHROCYTE [DISTWIDTH] IN BLOOD BY AUTOMATED COUNT: 12.1 % (ref 11.5–14.5)
EST. AVERAGE GLUCOSE BLD GHB EST-MCNC: 111 MG/DL
GLUCOSE SERPL-MCNC: 99 MG/DL (ref 74–99)
HBA1C MFR BLD: 5.5 %
HCT VFR BLD AUTO: 40.9 % (ref 36–46)
HDLC SERPL-MCNC: 79.8 MG/DL
HGB BLD-MCNC: 13.4 G/DL (ref 12–16)
LDLC SERPL CALC-MCNC: 97 MG/DL
MCH RBC QN AUTO: 30.7 PG (ref 26–34)
MCHC RBC AUTO-ENTMCNC: 32.8 G/DL (ref 32–36)
MCV RBC AUTO: 94 FL (ref 80–100)
NON HDL CHOLESTEROL: 120 MG/DL (ref 0–149)
NRBC BLD-RTO: 0 /100 WBCS (ref 0–0)
PLATELET # BLD AUTO: 296 X10*3/UL (ref 150–450)
POTASSIUM SERPL-SCNC: 5 MMOL/L (ref 3.5–5.3)
PROT SERPL-MCNC: 7.3 G/DL (ref 6.4–8.2)
RBC # BLD AUTO: 4.36 X10*6/UL (ref 4–5.2)
SODIUM SERPL-SCNC: 141 MMOL/L (ref 136–145)
TRIGL SERPL-MCNC: 118 MG/DL (ref 0–149)
TSH SERPL-ACNC: 2.41 MIU/L (ref 0.44–3.98)
VIT B12 SERPL-MCNC: 1675 PG/ML (ref 211–911)
VLDL: 24 MG/DL (ref 0–40)
WBC # BLD AUTO: 7.8 X10*3/UL (ref 4.4–11.3)

## 2024-08-09 ENCOUNTER — HOSPITAL ENCOUNTER (OUTPATIENT)
Dept: RADIOLOGY | Facility: CLINIC | Age: 69
Discharge: HOME | End: 2024-08-09
Payer: MEDICARE

## 2024-08-09 DIAGNOSIS — Z13.6 SCREENING FOR CARDIOVASCULAR CONDITION: Chronic | ICD-10-CM

## 2024-08-09 DIAGNOSIS — R05.1 ACUTE COUGH: Chronic | ICD-10-CM

## 2024-08-09 PROCEDURE — 75571 CT HRT W/O DYE W/CA TEST: CPT

## 2024-08-09 PROCEDURE — 71046 X-RAY EXAM CHEST 2 VIEWS: CPT

## 2024-08-12 PROBLEM — Z13.6 ENCOUNTER FOR SCREENING FOR CORONARY ARTERY DISEASE: Chronic | Status: ACTIVE | Noted: 2024-08-12

## 2025-01-29 PROBLEM — N18.31 STAGE 3A CHRONIC KIDNEY DISEASE (MULTI): Chronic | Status: ACTIVE | Noted: 2025-01-29

## 2025-01-29 PROBLEM — R53.83 OTHER FATIGUE: Chronic | Status: RESOLVED | Noted: 2024-08-07 | Resolved: 2025-01-29

## 2025-01-29 PROBLEM — N18.31 STAGE 3A CHRONIC KIDNEY DISEASE (MULTI): Chronic | Status: ACTIVE | Noted: 2024-08-08

## 2025-01-29 PROBLEM — E78.00 HYPERCHOLESTEROLEMIA: Chronic | Status: RESOLVED | Noted: 2024-02-06 | Resolved: 2025-01-29

## 2025-01-29 PROBLEM — R05.1 ACUTE COUGH: Chronic | Status: RESOLVED | Noted: 2024-08-07 | Resolved: 2025-01-29

## 2025-01-31 NOTE — PROGRESS NOTES
Subjective        Radha Castañeda is a 69 y.o. female here for follow up   Chief Complaint   Patient presents with    Medicare Annual Wellness Visit Initial            HPI     Wellness/health maintenance also discussed/[facility] note      2024-  Colonoscopy was ordered- has this test been done?- pt will consider   Pt did not do testing  Pt was referred to Rheum- who did pt see?  Pt did not see provider        Pt is having some difficulty sleeping  on and off     Also here for follow up vit d, cholesterol , prediabetes ,osteoporosis, postmenopausal     Due for lab                 Patient Health Questionnaire-2 Score: 0 (02/10/25 5817)       A physical exam and/or Medicare Wellness was done at  today's office visit and if patient has requested other problems be addressed at today's office visit in addition to the physical portion of the visit:  the patient has been informed that they may be charged for both portions: discussing the medical problems they want addressed and the complete physical exam. Patient has signed consent and was given brochure.          Medicare Wellness Exam    The patent is being seen for subsequent  annual wellness visit  Past Medical, Surgical and family History: Reviewed and updated in chart  Medications and Supplements: Review of all medications by a prescribing practitioner or clinical pharmacist (such as prescriptions, OTC, Herbal therapies and supplements) documented in the medical record.      Immunization History   Administered Date(s) Administered    DT (pediatric) 10/11/1999    Flu vaccine (IIV4), preservative free *Check age/dose* 09/23/2018, 10/24/2019, 09/21/2020, 10/29/2021    Flu vaccine, quadrivalent, high-dose, preservative free, age 65y+ (FLUZONE) 10/04/2022, 10/04/2023    Flu vaccine, trivalent, preservative free, HIGH-DOSE, age 65y+ (Fluzone) 10/04/2022, 10/04/2023, 10/11/2024    Flu vaccine, trivalent, preservative free, age 6 months and greater (Fluarix/Fluzone/Flulaval)  09/19/2015, 10/14/2016, 09/20/2017    Influenza, Unspecified 09/19/2015, 10/14/2016    Influenza, injectable, quadrivalent 09/21/2020    Influenza, seasonal, injectable 10/01/2016, 09/20/2017, 09/23/2018, 10/24/2019, 10/29/2021    Pfizer COVID-19 vaccine, 12 years and older, (30mcg/0.3mL) (Comirnaty) 01/04/2024, 10/25/2024    Pfizer COVID-19 vaccine, bivalent, age 12 years and older (30 mcg/0.3 mL) 10/18/2022    Pfizer Gray Cap SARS-CoV-2 07/19/2022    Pfizer Purple Cap SARS-CoV-2 04/15/2021, 05/07/2021, 12/10/2021, 10/18/2022    Pneumococcal conjugate vaccine, 20-valent (PREVNAR 20) 02/06/2023    SARS-CoV-2, Unspecified 07/19/2022    Tdap vaccine, age 7 year and older (BOOSTRIX, ADACEL) 08/23/2010       Labs reported in this progress note have been reviewed at or prior to this office visit.        Lab on 08/07/2024   Component Date Value Ref Range Status    Albumin 08/07/2024 4.9  3.4 - 5.0 g/dL Final    Bilirubin, Total 08/07/2024 1.1  0.0 - 1.2 mg/dL Final    Bilirubin, Direct 08/07/2024 0.2  0.0 - 0.3 mg/dL Final    Alkaline Phosphatase 08/07/2024 62  33 - 136 U/L Final    ALT 08/07/2024 16  7 - 45 U/L Final    Patients treated with Sulfasalazine may generate falsely decreased results for ALT.    AST 08/07/2024 16  9 - 39 U/L Final    Total Protein 08/07/2024 7.3  6.4 - 8.2 g/dL Final    Cholesterol 08/07/2024 200 (H)  0 - 199 mg/dL Final          Age      Desirable   Borderline High   High     0-19 Y     0 - 169       170 - 199     >/= 200    20-24 Y     0 - 189       190 - 224     >/= 225         >24 Y     0 - 199       200 - 239     >/= 240   **All ranges are based on fasting samples. Specific   therapeutic targets will vary based on patient-specific   cardiac risk.    Pediatric guidelines reference:Pediatrics 2011, 128(S5).Adult guidelines reference: NCEP ATPIII Guidelines,ANTONETTE 2001, 258:2486-97    Venipuncture immediately after or during the administration of Metamizole may lead to falsely low results.  Testing should be performed immediately prior to Metamizole dosing.    HDL-Cholesterol 08/07/2024 79.8  mg/dL Final      Age       Very Low   Low     Normal    High    0-19 Y    < 35      < 40     40-45     ----  20-24 Y    ----     < 40      >45      ----        >24 Y      ----     < 40     40-60      >60      Cholesterol/HDL Ratio 08/07/2024 2.5   Final      Ref Values  Desirable  < 3.4  High Risk  > 5.0    LDL Calculated 08/07/2024 97  <=99 mg/dL Final                                Near   Borderline      AGE      Desirable  Optimal    High     High     Very High     0-19 Y     0 - 109     ---    110-129   >/= 130     ----    20-24 Y     0 - 119     ---    120-159   >/= 160     ----      >24 Y     0 -  99   100-129  130-159   160-189     >/=190      VLDL 08/07/2024 24  0 - 40 mg/dL Final    Triglycerides 08/07/2024 118  0 - 149 mg/dL Final       Age         Desirable   Borderline High   High     Very High   0 D-90 D    19 - 174         ----         ----        ----  91 D- 9 Y     0 -  74        75 -  99     >/= 100      ----    10-19 Y     0 -  89        90 - 129     >/= 130      ----    20-24 Y     0 - 114       115 - 149     >/= 150      ----         >24 Y     0 - 149       150 - 199    200- 499    >/= 500    Venipuncture immediately after or during the administration of Metamizole may lead to falsely low results. Testing should be performed immediately prior to Metamizole dosing.    Non HDL Cholesterol 08/07/2024 120  0 - 149 mg/dL Final          Age       Desirable   Borderline High   High     Very High     0-19 Y     0 - 119       120 - 144     >/= 145    >/= 160    20-24 Y     0 - 149       150 - 189     >/= 190      ----         >24 Y    30 mg/dL above LDL Cholesterol goal      Hemoglobin A1C 08/07/2024 5.5  see below % Final    Estimated Average Glucose 08/07/2024 111  Not Established mg/dL Final    Vitamin D, 25-Hydroxy, Total 08/07/2024 47  30 - 100 ng/mL Final    WBC 08/07/2024 7.8  4.4 - 11.3 x10*3/uL  Final    nRBC 08/07/2024 0.0  0.0 - 0.0 /100 WBCs Final    RBC 08/07/2024 4.36  4.00 - 5.20 x10*6/uL Final    Hemoglobin 08/07/2024 13.4  12.0 - 16.0 g/dL Final    Hematocrit 08/07/2024 40.9  36.0 - 46.0 % Final    MCV 08/07/2024 94  80 - 100 fL Final    MCH 08/07/2024 30.7  26.0 - 34.0 pg Final    MCHC 08/07/2024 32.8  32.0 - 36.0 g/dL Final    RDW 08/07/2024 12.1  11.5 - 14.5 % Final    Platelets 08/07/2024 296  150 - 450 x10*3/uL Final    Glucose 08/07/2024 99  74 - 99 mg/dL Final    Sodium 08/07/2024 141  136 - 145 mmol/L Final    Potassium 08/07/2024 5.0  3.5 - 5.3 mmol/L Final    Chloride 08/07/2024 106  98 - 107 mmol/L Final    Bicarbonate 08/07/2024 26  21 - 32 mmol/L Final    Anion Gap 08/07/2024 14  10 - 20 mmol/L Final    Urea Nitrogen 08/07/2024 23  6 - 23 mg/dL Final    Creatinine 08/07/2024 1.17 (H)  0.50 - 1.05 mg/dL Final    eGFR 08/07/2024 51 (L)  >60 mL/min/1.73m*2 Final    Calculations of estimated GFR are performed using the 2021 CKD-EPI Study Refit equation without the race variable for the IDMS-Traceable creatinine methods.  https://jasn.asnjournals.org/content/early/2021/09/22/ASN.0753903435    Calcium 08/07/2024 10.7 (H)  8.6 - 10.6 mg/dL Final    Thyroid Stimulating Hormone 08/07/2024 2.41  0.44 - 3.98 mIU/L Final    Vitamin B12 08/07/2024 1,675 (H)  211 - 911 pg/mL Final             Health Maintenance Topics        Topic Date     Medicare Annual Wellness Visit (AWV) today    Colorectal Cancer Screening Ordered 2/2024    Bone Density Scan- history osteoporosis DXA ordered 2/2023 and 8/2023    Mammogram 7/2024       Health Maintenance Topics with due status: Completed       Topic Last Completion Date    Hepatitis C Screening 02/04/2022    Pneumococcal Vaccine: 65+ Years- P-20  02/06/2023    Tdap and Shingrix at pharmacy- discussed               Social History     Tobacco Use   Smoking Status Never   Smokeless Tobacco Never       Alcohol Use: Not on file         Objective        Vitals:     "02/10/25 0841   BP: 125/75   BP Location: Left arm   Patient Position: Sitting   BP Cuff Size: Adult   Pulse: 84   Resp: 14   Temp: 35.9 °C (96.7 °F)   TempSrc: Temporal   SpO2: 98%   Weight: 65.7 kg (144 lb 14.4 oz)   Height: 1.657 m (5' 5.25\")           Patient is alert and oriented x 3 , NAD  HEAD- normocephalic and atraumatic   EYES-conjunctiva-normal, lids - normal  EARS/NOSE- normal external exam   NECK-supple,FROM  CV- RRR without murmur  PULM- CTA bilaterally, normal respiratory effort  RESPIRATORY EFFORT- normal , no retractions or nasal flaring   ABD- normoactive BS's   EXT- no edema,NT  SKIN- no abnormal skin lesions noted  NEURO- no focal deficits  PSYCH- pleasant, normal judgement and insight      BP Readings from Last 3 Encounters:   02/10/25 125/75   08/07/24 121/77   02/07/24 116/77           Wt Readings from Last 3 Encounters:   02/10/25 65.7 kg (144 lb 14.4 oz)   08/07/24 64.5 kg (142 lb 3.2 oz)   02/07/24 64.1 kg (141 lb 6.4 oz)         BMI Readings from Last 3 Encounters:   02/10/25 23.93 kg/m²   08/07/24 23.66 kg/m²   02/07/24 23.35 kg/m²         The number and complexity of problems addressed is considered moderate.  The amount and/or complexity of data reviewed and analyzed is considered moderate. The risk of complications and/or morbidity/mortality of patient is considered moderate. Overall, this patient encounter is considered a moderate risk visit.    Patient has no concerns with pain today.      Patient is not on any high risk medications.      Patient is compliant with their medications.    Statins:   Although side effects believed to be caused by statins can be annoying, consider the benefits of taking a statin before you decide to stop taking your medication. Remember that statin medications can reduce your risk of a heart attack or stroke, and the risk of life-threatening side effects from statins is very low.    If you have read about the potential side effects of statins, you may be " more likely to blame your symptoms on the medication, whether or not they're truly caused by the drug.    Even if your side effects are frustrating, don't stop taking your statin medication for any period of time without talking to your doctor first. Your doctor may be able to come up with an alternative treatment plan that can help you lower your cholesterol without uncomfortable side effects.    Side effects that may occur include muscle aches, elevation liver enzymes, very small incidence memory loss or confusion and increasing blood sugar.        Assessment/Plan        Assessment & Plan  Medicare annual wellness visit, subsequent         Screening for multiple conditions         Hypercholesterolemia  Atorvastatin      Prediabetes  Diet and exercise    Orders:    Hemoglobin A1C; Future    Vitamin D deficiency  Taking supplements   Orders:    Vitamin D 25-Hydroxy,Total (for eval of Vitamin D levels); Future    Postmenopausal         Osteoporosis, unspecified osteoporosis type, unspecified pathological fracture presence  On vit d   Referred Rheum 8/2024; place new referral today     Orders:    Referral to Rheumatology; Future    Stage 3a chronic kidney disease (Multi)  reatinine  0.50 - 1.05 mg/dL 1.17 High  0.92     eGFR  >60 mL/min/1.73m*2 51 Low       Slightly worse than 2021 , increase fluids, recheck,              Screen for colon cancer    Orders:    Colonoscopy Screening; Average Risk Patient; Future    Insomnia, unspecified type  Discussed melatonin                Pt has treadmill and stationary bike                 Continue medications      Ordered lab        Refer colonoscopy                       Follow up in 6 months            Recommendations for women annual wellness exam:   Make sure screenings for cervical and breast cancer are up to date if applicable- pap smears age 21-65  mammogram starting at age 35- 40 or sooner if positive family history of breast cancer   colonoscopy at age 45, earlier if  positive family history for breast or colon cancer   Screen for osteoporosis with DXA bone scan starting at age 65 or sooner if risk factors present (long term steroid use, smoking, heavy alcohol use, history of fracture, rheumatoid arthritis, low body weight, family history of hip fracture)  Screening for lung cancer with low-dose CT in all adults age 50-80 who have a 20 pack-year smoking history and currently smoke or have quit within the past 15 years; and are symptom free/no prior pulmonary diagnosis  Gardisil vaccine age 9-26 years of age   Follow a healthy diet (Examples, Dash diet, Mediterranean diet)  Exercise 150 minutes per week   Maintain healthy weight (BMI < 25)  Do not smoke   Alcohol in moderation (up to 1 drink/day)  Get enough sleep (7-8 hours/night)  Take a prenatal vitamin with folic acid if possibility of pregnancy   Make sure immunizations are up to date   Recommend minimum 1,000 mg calcium and 600-800 IU vitamin D daily (combination of diet + supplement)- unless there is a contraindication   Visit dentist twice yearly      If you are less than 60 years old, have diabetes mellitus, or chronic kidney disease, your goal blood pressure is < 140/90.  If you are older than 60 years old and do not have diabetes or kidney disease, your goal blood pressure is < 150/90.

## 2025-01-31 NOTE — ASSESSMENT & PLAN NOTE
On vit d   Referred Rheum 8/2024; place new referral today     Orders:    Referral to Rheumatology; Future

## 2025-02-10 ENCOUNTER — APPOINTMENT (OUTPATIENT)
Dept: PRIMARY CARE | Facility: CLINIC | Age: 70
End: 2025-02-10
Payer: MEDICARE

## 2025-02-10 VITALS
HEART RATE: 84 BPM | DIASTOLIC BLOOD PRESSURE: 75 MMHG | RESPIRATION RATE: 14 BRPM | OXYGEN SATURATION: 98 % | TEMPERATURE: 96.7 F | HEIGHT: 65 IN | WEIGHT: 144.9 LBS | SYSTOLIC BLOOD PRESSURE: 125 MMHG | BODY MASS INDEX: 24.14 KG/M2

## 2025-02-10 DIAGNOSIS — Z00.00 MEDICARE ANNUAL WELLNESS VISIT, SUBSEQUENT: Primary | Chronic | ICD-10-CM

## 2025-02-10 DIAGNOSIS — Z12.11 SCREEN FOR COLON CANCER: ICD-10-CM

## 2025-02-10 DIAGNOSIS — G47.00 INSOMNIA, UNSPECIFIED TYPE: ICD-10-CM

## 2025-02-10 DIAGNOSIS — M81.0 OSTEOPOROSIS, UNSPECIFIED OSTEOPOROSIS TYPE, UNSPECIFIED PATHOLOGICAL FRACTURE PRESENCE: Chronic | ICD-10-CM

## 2025-02-10 DIAGNOSIS — E55.9 VITAMIN D DEFICIENCY: Chronic | ICD-10-CM

## 2025-02-10 DIAGNOSIS — Z78.0 POSTMENOPAUSAL: Chronic | ICD-10-CM

## 2025-02-10 DIAGNOSIS — N18.31 STAGE 3A CHRONIC KIDNEY DISEASE (MULTI): ICD-10-CM

## 2025-02-10 DIAGNOSIS — R73.03 PREDIABETES: Chronic | ICD-10-CM

## 2025-02-10 DIAGNOSIS — E78.00 HYPERCHOLESTEROLEMIA: Chronic | ICD-10-CM

## 2025-02-10 DIAGNOSIS — Z13.89 SCREENING FOR MULTIPLE CONDITIONS: Chronic | ICD-10-CM

## 2025-02-10 PROCEDURE — 3008F BODY MASS INDEX DOCD: CPT | Performed by: FAMILY MEDICINE

## 2025-02-10 PROCEDURE — 1170F FXNL STATUS ASSESSED: CPT | Performed by: FAMILY MEDICINE

## 2025-02-10 PROCEDURE — G2211 COMPLEX E/M VISIT ADD ON: HCPCS | Performed by: FAMILY MEDICINE

## 2025-02-10 PROCEDURE — 1160F RVW MEDS BY RX/DR IN RCRD: CPT | Performed by: FAMILY MEDICINE

## 2025-02-10 PROCEDURE — G0439 PPPS, SUBSEQ VISIT: HCPCS | Performed by: FAMILY MEDICINE

## 2025-02-10 PROCEDURE — 1036F TOBACCO NON-USER: CPT | Performed by: FAMILY MEDICINE

## 2025-02-10 PROCEDURE — 1123F ACP DISCUSS/DSCN MKR DOCD: CPT | Performed by: FAMILY MEDICINE

## 2025-02-10 PROCEDURE — 1159F MED LIST DOCD IN RCRD: CPT | Performed by: FAMILY MEDICINE

## 2025-02-10 ASSESSMENT — ACTIVITIES OF DAILY LIVING (ADL)
BATHING: INDEPENDENT
TAKING_MEDICATION: INDEPENDENT
GROCERY_SHOPPING: INDEPENDENT
MANAGING_FINANCES: INDEPENDENT
DOING_HOUSEWORK: INDEPENDENT
DRESSING: INDEPENDENT

## 2025-02-10 ASSESSMENT — PATIENT HEALTH QUESTIONNAIRE - PHQ9
2. FEELING DOWN, DEPRESSED OR HOPELESS: NOT AT ALL
1. LITTLE INTEREST OR PLEASURE IN DOING THINGS: NOT AT ALL
SUM OF ALL RESPONSES TO PHQ9 QUESTIONS 1 AND 2: 0

## 2025-02-10 NOTE — ASSESSMENT & PLAN NOTE
reatinine  0.50 - 1.05 mg/dL 1.17 High  0.92     eGFR  >60 mL/min/1.73m*2 51 Low       Slightly worse than 2021 , increase fluids, recheck,

## 2025-02-11 LAB
25(OH)D3+25(OH)D2 SERPL-MCNC: 52 NG/ML (ref 30–100)
ALBUMIN SERPL-MCNC: 4.8 G/DL (ref 3.6–5.1)
ALBUMIN/GLOB SERPL: 1.9 (CALC) (ref 1–2.5)
ALP SERPL-CCNC: 66 U/L (ref 37–153)
ALT SERPL-CCNC: 22 U/L (ref 6–29)
AST SERPL-CCNC: 22 U/L (ref 10–35)
BILIRUB DIRECT SERPL-MCNC: 0.1 MG/DL
BILIRUB INDIRECT SERPL-MCNC: 0.6 MG/DL (CALC) (ref 0.2–1.2)
BILIRUB SERPL-MCNC: 0.7 MG/DL (ref 0.2–1.2)
CHOLEST SERPL-MCNC: 208 MG/DL
CHOLEST/HDLC SERPL: 2.6 (CALC)
EST. AVERAGE GLUCOSE BLD GHB EST-MCNC: 111 MG/DL
EST. AVERAGE GLUCOSE BLD GHB EST-SCNC: 6.2 MMOL/L
GLOBULIN SER CALC-MCNC: 2.5 G/DL (CALC) (ref 1.9–3.7)
HBA1C MFR BLD: 5.5 % OF TOTAL HGB
HDLC SERPL-MCNC: 80 MG/DL
LDLC SERPL CALC-MCNC: 103 MG/DL (CALC)
NONHDLC SERPL-MCNC: 128 MG/DL (CALC)
PROT SERPL-MCNC: 7.3 G/DL (ref 6.1–8.1)
TRIGL SERPL-MCNC: 156 MG/DL

## 2025-07-19 ENCOUNTER — OFFICE VISIT (OUTPATIENT)
Dept: URGENT CARE | Age: 70
End: 2025-07-19
Payer: MEDICARE

## 2025-07-19 VITALS
WEIGHT: 149 LBS | RESPIRATION RATE: 20 BRPM | SYSTOLIC BLOOD PRESSURE: 144 MMHG | HEIGHT: 65 IN | BODY MASS INDEX: 24.83 KG/M2 | DIASTOLIC BLOOD PRESSURE: 83 MMHG | HEART RATE: 94 BPM | OXYGEN SATURATION: 97 % | TEMPERATURE: 99.5 F

## 2025-07-19 DIAGNOSIS — L03.317 CELLULITIS OF BUTTOCK, RIGHT: Primary | ICD-10-CM

## 2025-07-19 RX ORDER — CEPHALEXIN 500 MG/1
500 CAPSULE ORAL 3 TIMES DAILY
Qty: 30 CAPSULE | Refills: 0 | Status: SHIPPED | OUTPATIENT
Start: 2025-07-19 | End: 2025-07-29

## 2025-07-19 ASSESSMENT — PAIN SCALES - GENERAL: PAINLEVEL_OUTOF10: 0-NO PAIN

## 2025-07-19 NOTE — PROGRESS NOTES
"Subjective   Patient ID: Radha Castañeda is a 70 y.o. female. They present today with a chief complaint of Rash.    History of Present Illness  HPI  Patient presents with a skin lesion on her right upper buttocks for the past 4 to 5 days.  She notes of no specific injury, exposure or insect bite to that area.  She states pain is relatively minimal but that it causes her \"irritation\".  No over-the-counter treatments have been used.  No fevers or chills.  No drainage or pus noted.  She denies nausea vomiting or diarrhea.  Past Medical History  Allergies as of 07/19/2025    (No Known Allergies)       Prescriptions Prior to Admission[1]     Medical History[2]    Surgical History[3]     reports that she has never smoked. She has never used smokeless tobacco. She reports that she does not currently use alcohol. She reports that she does not use drugs.    Review of Systems  Review of Systems     As in history of present illness                          Objective    Vitals:    07/19/25 1316   BP: 144/83   Pulse: 94   Resp: 20   Temp: 37.5 °C (99.5 °F)   TempSrc: Oral   SpO2: 97%   Weight: 67.6 kg (149 lb)   Height: 1.651 m (5' 5\")     No LMP recorded. Patient is postmenopausal.    Physical Exam  General: Vitals noted, no distress. Afebrile.   EENT: TMs clear. Posterior oropharynx unremarkable.     Extremities: No peripheral edema. Negative Homans bilaterally, no cords.   Skin: A 1 inch x 2 inch oval erythematous mildly tender rough patch of skin is noted on right upper buttocks with no fluctuance, pustules or vesicles  Procedures    Point of Care Test & Imaging Results from this visit  No results found for this visit on 07/19/25.   Imaging  No results found.    Cardiology, Vascular, and Other Imaging  No other imaging results found for the past 2 days      Diagnostic study results (if any) were reviewed by Doni Ritchie MD.    Assessment/Plan   Allergies, medications, history, and pertinent labs/EKGs/Imaging reviewed by " Doni Ritchie MD.     Medical Decision Making  At time of discharge patient was clinically well-appearing and HDS for outpatient management. The patient and/or family was educated regarding diagnosis, supportive care, OTC and Rx medications. The patient and/or family was given the opportunity to ask questions prior to discharge.  They verbalized understanding of my discussion of the plans for treatment, expected course, indications to return to  or seek further evaluation in ED, and the need for timely follow up as directed.   They were provided with a work/school excuse if requested.    Orders and Diagnoses  Diagnoses and all orders for this visit:  Cellulitis of buttock, right  -     cephalexin (Keflex) 500 mg capsule; Take 1 capsule (500 mg) by mouth 3 times a day for 10 days.      Medical Admin Record      Patient disposition: Home    Electronically signed by Doni Ritchie MD  1:34 PM           [1] (Not in a hospital admission)   [2]   Past Medical History:  Diagnosis Date    Acute cough 2024    Age-related osteoporosis without current pathological fracture 2013    Osteoporosis, senile    Calcaneal spur, unspecified foot 2013    Calcaneal spur    Calcaneal spur, unspecified foot 2014    Heel spur    Cough, unspecified 2014    Cough    Encounter for screening for diabetes mellitus 2021    Screening for diabetes mellitus    Hypercholesterolemia 2024    Other fatigue 2024    Palpitations 2013    Palpitations    Personal history of other diseases of the female genital tract     Personal history of ovarian cyst    Synovial cyst of popliteal space ([place]), unspecified knee 2015    Popliteal cyst   [3]   Past Surgical History:  Procedure Laterality Date     SECTION, CLASSIC  2014     Section    COLONOSCOPY  2014    Complete Colonoscopy

## 2025-07-19 NOTE — PATIENT INSTRUCTIONS
Take medication with food as directed until completed  May use low-dose over-the-counter hydrocortisone cream as needed for redness and irritation  Tylenol as needed for discomfort  Follow-up for fevers or worsening symptoms

## 2025-09-16 ENCOUNTER — APPOINTMENT (OUTPATIENT)
Dept: PRIMARY CARE | Facility: CLINIC | Age: 70
End: 2025-09-16
Payer: MEDICARE